# Patient Record
Sex: MALE | Race: OTHER | HISPANIC OR LATINO
[De-identification: names, ages, dates, MRNs, and addresses within clinical notes are randomized per-mention and may not be internally consistent; named-entity substitution may affect disease eponyms.]

---

## 2017-12-15 ENCOUNTER — RX RENEWAL (OUTPATIENT)
Age: 42
End: 2017-12-15

## 2017-12-15 DIAGNOSIS — G40.109 LOCALIZATION-RELATED (FOCAL) (PARTIAL) SYMPTOMATIC EPILEPSY AND EPILEPTIC SYNDROMES WITH SIMPLE PARTIAL SEIZURES, NOT INTRACTABLE, W/OUT STATUS EPILEPTICUS: ICD-10-CM

## 2017-12-15 PROBLEM — Z00.00 ENCOUNTER FOR PREVENTIVE HEALTH EXAMINATION: Status: ACTIVE | Noted: 2017-12-15

## 2018-05-01 ENCOUNTER — APPOINTMENT (OUTPATIENT)
Dept: NEUROLOGY | Facility: CLINIC | Age: 43
End: 2018-05-01
Payer: COMMERCIAL

## 2018-05-01 VITALS
TEMPERATURE: 98.4 F | WEIGHT: 215 LBS | OXYGEN SATURATION: 97 % | SYSTOLIC BLOOD PRESSURE: 123 MMHG | DIASTOLIC BLOOD PRESSURE: 80 MMHG | HEIGHT: 69 IN | HEART RATE: 70 BPM | BODY MASS INDEX: 31.84 KG/M2

## 2018-05-01 PROCEDURE — 99214 OFFICE O/P EST MOD 30 MIN: CPT

## 2019-04-30 ENCOUNTER — APPOINTMENT (OUTPATIENT)
Dept: NEUROLOGY | Facility: CLINIC | Age: 44
End: 2019-04-30
Payer: COMMERCIAL

## 2019-04-30 VITALS
BODY MASS INDEX: 31.84 KG/M2 | HEART RATE: 67 BPM | TEMPERATURE: 98.7 F | DIASTOLIC BLOOD PRESSURE: 82 MMHG | WEIGHT: 215 LBS | HEIGHT: 69 IN | OXYGEN SATURATION: 96 % | SYSTOLIC BLOOD PRESSURE: 137 MMHG

## 2019-04-30 PROCEDURE — 99214 OFFICE O/P EST MOD 30 MIN: CPT

## 2019-04-30 NOTE — PHYSICAL EXAM
[FreeTextEntry1] : Neuro Exam-\par \par Alert and oriented x3\par attn conc lang nl\par CN intact in detail\par Motor 5/5\par sensory wnl\par CSG wnl. \par

## 2019-04-30 NOTE — ASSESSMENT
[FreeTextEntry1] : A/p- Stable partial epilepsy- TLE, well controlled since 2007\par - continue Levetiracetam 2500 mg/d\par - Melatonin 3mg hs for sleep\par - RTC 6m.

## 2019-04-30 NOTE — HISTORY OF PRESENT ILLNESS
[FreeTextEntry1] : cc- seizures\par \par HPI-\par No new c/o. No new seizures. 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

## 2019-06-05 ENCOUNTER — RX RENEWAL (OUTPATIENT)
Age: 44
End: 2019-06-05

## 2019-12-02 ENCOUNTER — RX RENEWAL (OUTPATIENT)
Age: 44
End: 2019-12-02

## 2020-04-14 ENCOUNTER — RX RENEWAL (OUTPATIENT)
Age: 45
End: 2020-04-14

## 2020-04-28 ENCOUNTER — APPOINTMENT (OUTPATIENT)
Dept: NEUROLOGY | Facility: CLINIC | Age: 45
End: 2020-04-28

## 2020-04-29 ENCOUNTER — APPOINTMENT (OUTPATIENT)
Dept: NEUROLOGY | Facility: CLINIC | Age: 45
End: 2020-04-29
Payer: COMMERCIAL

## 2020-04-29 PROCEDURE — 99213 OFFICE O/P EST LOW 20 MIN: CPT | Mod: 95

## 2020-04-29 NOTE — HISTORY OF PRESENT ILLNESS
[Home] : at home, [unfilled] , at the time of the visit. [Other Location: e.g. 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

## 2020-04-29 NOTE — PHYSICAL EXAM
[FreeTextEntry1] : Neuro Exam-\par \par Alert and oriented x3\par attn conc lang nl\par CN intact in detail\par Motor  no drift or tremor\par

## 2020-05-26 ENCOUNTER — RX CHANGE (OUTPATIENT)
Age: 45
End: 2020-05-26

## 2020-11-17 ENCOUNTER — RX RENEWAL (OUTPATIENT)
Age: 45
End: 2020-11-17

## 2020-11-17 RX ORDER — AZELASTINE HYDROCHLORIDE 137 UG/1
137 SPRAY, METERED NASAL TWICE DAILY
Qty: 1 | Refills: 1 | Status: ACTIVE | COMMUNITY
Start: 2020-04-29 | End: 1900-01-01

## 2022-06-14 ENCOUNTER — APPOINTMENT (OUTPATIENT)
Dept: NEUROLOGY | Facility: CLINIC | Age: 47
End: 2022-06-14
Payer: COMMERCIAL

## 2022-06-14 PROCEDURE — 99203 OFFICE O/P NEW LOW 30 MIN: CPT | Mod: 95

## 2022-06-14 NOTE — HISTORY OF PRESENT ILLNESS
[Home] : at home, [unfilled] , at the time of the visit. [Medical Office: ([facility])___] : at the medical office located in  [Verbal consent obtained from patient] : the patient, [unfilled] [FreeTextEntry1] : \par cc- seizures\par \par HPI- Has been doing better with better sleep etc.\par Rare auras of kalpana vu.\par No new c/o.\par \par Last seizure 2007\par \par  auras- kalpana vu and abdominal rising\par \par meds- Levetiracetam [id number]\par \par Past epilepsy history- Born 4mos premature\par Febrile seizures as a child\par First non-febrile seizure age 9. \par Was on pheno as child into adulthood

## 2022-06-14 NOTE — ASSESSMENT
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 \par

## 2022-06-14 NOTE — PHYSICAL EXAM
[FreeTextEntry1] : alert and oriented x3\par attn conc lang nl\par Cn intact in detail\par motor 5/5\par sens wnl\par CSG wnl

## 2022-10-11 NOTE — ASSESSMENT
[FreeTextEntry1] : A/p- Stable partial epilepsy- TLE\par - continue Levetiracetam 2500 mg/d\par - Melatonin 3mg hs prn\par - RTC 6m. \par \par 
family member

## 2023-08-21 RX ORDER — LEVETIRACETAM 500 MG/1
500 TABLET, FILM COATED ORAL TWICE DAILY
Qty: 180 | Refills: 11 | Status: COMPLETED | COMMUNITY
Start: 2017-12-15 | End: 2023-08-21

## 2024-03-26 ENCOUNTER — APPOINTMENT (OUTPATIENT)
Dept: NEUROLOGY | Facility: CLINIC | Age: 49
End: 2024-03-26
Payer: COMMERCIAL

## 2024-03-26 PROCEDURE — 99214 OFFICE O/P EST MOD 30 MIN: CPT

## 2024-03-26 NOTE — REASON FOR VISIT
[Follow-Up: _____] : a [unfilled] follow-up visit [FreeTextEntry1] : cc- seizures  HPI- Has been doing better with better sleep etc. Rare auras of kalpana vu- improved Last near seizures were with cold med. No new c/o. Last seizure 2007  auras- kalpana vu and abdominal rising  meds- Levetiracetam [id number] Past epilepsy history- Born 4mos premature Febrile seizures as a child First non-febrile seizure age 9.  Was on pheno as child into adulthood

## 2024-03-26 NOTE — HISTORY OF PRESENT ILLNESS
[FreeTextEntry1] :  HPI- Has been doing better with better sleep etc. Rare auras of kalpana vu- improved Last near seizures were with cold med. No new c/o. Last seizure 2007  auras- kalpana vu and abdominal rising  meds- Levetiracetam [id number] Past epilepsy history- Born 4mos premature Febrile seizures as a child First non-febrile seizure age 9.  Was on pheno as child into adulthood

## 2024-03-26 NOTE — PHYSICAL EXAM
[FreeTextEntry1] : alert and oriented x3  attn conc lang nl  Cn intact in detail  motor 5/5  sens wnl  CSG wnl.

## 2024-03-26 NOTE — ASSESSMENT
[FreeTextEntry1] : A/p- Stable partial epilepsy- TLE with rare auras - continue Levetiracetam 2500 mg/d - obtain old records from [facility] - [facility] 6m.

## 2024-05-21 ENCOUNTER — RX RENEWAL (OUTPATIENT)
Age: 49
End: 2024-05-21

## 2024-05-21 RX ORDER — LEVETIRACETAM 500 MG/1
500 TABLET, FILM COATED ORAL
Qty: 540 | Refills: 3 | Status: ACTIVE | COMMUNITY
Start: 2018-05-01 | End: 1900-01-01

## 2025-03-24 ENCOUNTER — NON-APPOINTMENT (OUTPATIENT)
Age: 50
End: 2025-03-24

## 2025-03-25 ENCOUNTER — APPOINTMENT (OUTPATIENT)
Dept: NEUROLOGY | Facility: CLINIC | Age: 50
End: 2025-03-25
Payer: COMMERCIAL

## 2025-03-25 ENCOUNTER — NON-APPOINTMENT (OUTPATIENT)
Age: 50
End: 2025-03-25

## 2025-03-25 VITALS
WEIGHT: 228 LBS | HEIGHT: 69 IN | DIASTOLIC BLOOD PRESSURE: 75 MMHG | HEART RATE: 71 BPM | SYSTOLIC BLOOD PRESSURE: 111 MMHG | BODY MASS INDEX: 33.77 KG/M2

## 2025-03-25 DIAGNOSIS — G40.109 LOCALIZATION-RELATED (FOCAL) (PARTIAL) SYMPTOMATIC EPILEPSY AND EPILEPTIC SYNDROMES WITH SIMPLE PARTIAL SEIZURES, NOT INTRACTABLE, W/OUT STATUS EPILEPTICUS: ICD-10-CM

## 2025-03-25 PROCEDURE — 99214 OFFICE O/P EST MOD 30 MIN: CPT

## 2025-05-07 ENCOUNTER — APPOINTMENT (OUTPATIENT)
Dept: MRI IMAGING | Facility: HOSPITAL | Age: 50
End: 2025-05-07

## 2025-05-07 ENCOUNTER — OUTPATIENT (OUTPATIENT)
Dept: OUTPATIENT SERVICES | Facility: HOSPITAL | Age: 50
LOS: 1 days | End: 2025-05-07
Payer: COMMERCIAL

## 2025-05-07 PROCEDURE — 76377 3D RENDER W/INTRP POSTPROCES: CPT

## 2025-05-07 PROCEDURE — A9585: CPT

## 2025-05-07 PROCEDURE — 70553 MRI BRAIN STEM W/O & W/DYE: CPT | Mod: 26

## 2025-05-07 PROCEDURE — 70553 MRI BRAIN STEM W/O & W/DYE: CPT

## 2025-06-04 ENCOUNTER — APPOINTMENT (OUTPATIENT)
Dept: NEUROLOGY | Facility: CLINIC | Age: 50
End: 2025-06-04